# Patient Record
Sex: FEMALE | Race: WHITE
[De-identification: names, ages, dates, MRNs, and addresses within clinical notes are randomized per-mention and may not be internally consistent; named-entity substitution may affect disease eponyms.]

---

## 2018-06-04 ENCOUNTER — HOSPITAL ENCOUNTER (OUTPATIENT)
Dept: HOSPITAL 47 - RADUSWWP | Age: 83
Discharge: HOME | End: 2018-06-04
Payer: MEDICARE

## 2018-06-04 DIAGNOSIS — E04.2: Primary | ICD-10-CM

## 2018-06-04 DIAGNOSIS — E05.90: ICD-10-CM

## 2018-06-04 LAB — T4 FREE SERPL-MCNC: 1.11 NG/DL (ref 0.78–2.19)

## 2018-06-04 PROCEDURE — 84443 ASSAY THYROID STIM HORMONE: CPT

## 2018-06-04 PROCEDURE — 84445 ASSAY OF TSI GLOBULIN: CPT

## 2018-06-04 PROCEDURE — 36415 COLL VENOUS BLD VENIPUNCTURE: CPT

## 2018-06-04 PROCEDURE — 76536 US EXAM OF HEAD AND NECK: CPT

## 2018-06-04 PROCEDURE — 84480 ASSAY TRIIODOTHYRONINE (T3): CPT

## 2018-06-04 PROCEDURE — 84439 ASSAY OF FREE THYROXINE: CPT

## 2018-06-04 NOTE — US
EXAMINATION TYPE: US thyroid st tissue head/neck

 

DATE OF EXAM: 6/4/2018

 

COMPARISON: NONE

 

CLINICAL HISTORY: E05.90 HYPOTHYROIDISM. Hypothyroidism

 

GLAND SIZE:

 

Right Lobe: 4.9 x 1.5 x 2.1 cm

** Overall Parenchyma:  homogenous

Left Lobe: 4.0 x 1.7 x 1.6 cm

** Overall Parenchyma:  homogeneous

Isthmus Thickness:  0.4 cm

 

NODULES

 

RIGHT: # of nodules measured on right: 0 

 

LEFT: # of nodules measured on left:  2

1.  0.6 X 0.4  x 0.5 cm hypoechoic mixed nodule at the lower pole with well-defined margins. This nod
ule is wider than tall and shows no intranodular vascularity.

** Prior size: no previous 

2. 0.4 X 0.3  x 0.5 cm hypoechoic cystic nodule at the mid pole with well-defined margins. This nodul
e is wider than tall and shows no intranodular vascularity.

 ** Prior size:  no previous 

 

ISTHMUS: # of nodules measured in the isthmus:  0

 

Bilateral neck scanned, no evidence of lymphadenopathy.

 

 

 

 

 

IMPRESSION:

Subcentimeter left thyroid nodules that are too small for fine-needle aspiration. Surveillance is rec
ommended.

## 2018-07-11 ENCOUNTER — HOSPITAL ENCOUNTER (OUTPATIENT)
Dept: HOSPITAL 47 - RADNMMAIN | Age: 83
Discharge: HOME | End: 2018-07-11
Payer: MEDICARE

## 2018-07-11 DIAGNOSIS — D05.90: Primary | ICD-10-CM

## 2018-07-11 PROCEDURE — 78014 THYROID IMAGING W/BLOOD FLOW: CPT

## 2018-07-12 NOTE — NM
EXAMINATION TYPE: NM thyroid image w uptake

 

DATE OF EXAM: 7/12/2018

 

COMPARISON: Ultrasound thyroid 6/4/2018

 

HISTORY: Subclinical hyperthyroidism

 

TECHNIQUE:  Thyroid iodine uptake is calculated and images performed after the oral administration of
 322 uCi 1-123 Capsule.

 

FINDINGS: There is normal distribution of the radiopharmaceutical questionable increased uptake, only
 faint visualization of the submandibular glands. The 4 hour iodine uptake is calculated at 5.7% (nor
mal range 8-14%). The 24-hour iodine uptake is calculated at 17.9% (normal range 15-35%).  

 

 

 

IMPRESSION: Findings could be indicative of thyroiditis.

## 2018-08-03 ENCOUNTER — HOSPITAL ENCOUNTER (OUTPATIENT)
Dept: HOSPITAL 47 - LABWHC1 | Age: 83
Discharge: HOME | End: 2018-08-03
Payer: MEDICARE

## 2018-08-03 DIAGNOSIS — E05.90: Primary | ICD-10-CM

## 2018-08-03 LAB
ALBUMIN SERPL-MCNC: 4.3 G/DL (ref 3.5–5)
ALP SERPL-CCNC: 51 U/L (ref 38–126)
ALT SERPL-CCNC: 33 U/L (ref 9–52)
ANION GAP SERPL CALC-SCNC: 6 MMOL/L
AST SERPL-CCNC: 32 U/L (ref 14–36)
BUN SERPL-SCNC: 27 MG/DL (ref 7–17)
CALCIUM SPEC-MCNC: 9.9 MG/DL (ref 8.4–10.2)
CHLORIDE SERPL-SCNC: 108 MMOL/L (ref 98–107)
CO2 SERPL-SCNC: 27 MMOL/L (ref 22–30)
ERYTHROCYTE [DISTWIDTH] IN BLOOD BY AUTOMATED COUNT: 4.44 M/UL (ref 3.8–5.4)
ERYTHROCYTE [DISTWIDTH] IN BLOOD: 14.2 % (ref 11.5–15.5)
GLUCOSE SERPL-MCNC: 121 MG/DL (ref 74–99)
HCT VFR BLD AUTO: 38.3 % (ref 34–46)
HGB BLD-MCNC: 12.7 GM/DL (ref 11.4–16)
MCH RBC QN AUTO: 28.6 PG (ref 25–35)
MCHC RBC AUTO-ENTMCNC: 33.1 G/DL (ref 31–37)
MCV RBC AUTO: 86.2 FL (ref 80–100)
PLATELET # BLD AUTO: 199 K/UL (ref 150–450)
POTASSIUM SERPL-SCNC: 4.6 MMOL/L (ref 3.5–5.1)
PROT SERPL-MCNC: 7 G/DL (ref 6.3–8.2)
SODIUM SERPL-SCNC: 141 MMOL/L (ref 137–145)
WBC # BLD AUTO: 6.4 K/UL (ref 3.8–10.6)

## 2018-08-03 PROCEDURE — 36415 COLL VENOUS BLD VENIPUNCTURE: CPT

## 2018-08-03 PROCEDURE — 85027 COMPLETE CBC AUTOMATED: CPT

## 2018-08-03 PROCEDURE — 80053 COMPREHEN METABOLIC PANEL: CPT

## 2018-08-30 ENCOUNTER — HOSPITAL ENCOUNTER (OUTPATIENT)
Dept: HOSPITAL 47 - LABWHC1 | Age: 83
Discharge: HOME | End: 2018-08-30
Payer: MEDICARE

## 2018-08-30 DIAGNOSIS — E05.90: Primary | ICD-10-CM

## 2018-08-30 LAB — T4 FREE SERPL-MCNC: 0.89 NG/DL (ref 0.78–2.19)

## 2018-08-30 PROCEDURE — 84439 ASSAY OF FREE THYROXINE: CPT

## 2018-08-30 PROCEDURE — 36415 COLL VENOUS BLD VENIPUNCTURE: CPT

## 2018-08-30 PROCEDURE — 84480 ASSAY TRIIODOTHYRONINE (T3): CPT

## 2018-08-30 PROCEDURE — 84443 ASSAY THYROID STIM HORMONE: CPT

## 2018-10-19 ENCOUNTER — HOSPITAL ENCOUNTER (OUTPATIENT)
Dept: HOSPITAL 47 - LABWHC1 | Age: 83
Discharge: HOME | End: 2018-10-19
Payer: MEDICARE

## 2018-10-19 DIAGNOSIS — E05.90: Primary | ICD-10-CM

## 2018-10-19 LAB — T4 FREE SERPL-MCNC: 1.02 NG/DL (ref 0.78–2.19)

## 2018-10-19 PROCEDURE — 36415 COLL VENOUS BLD VENIPUNCTURE: CPT

## 2018-10-19 PROCEDURE — 84443 ASSAY THYROID STIM HORMONE: CPT

## 2018-10-19 PROCEDURE — 84439 ASSAY OF FREE THYROXINE: CPT

## 2018-10-19 PROCEDURE — 84480 ASSAY TRIIODOTHYRONINE (T3): CPT

## 2018-12-07 ENCOUNTER — HOSPITAL ENCOUNTER (OUTPATIENT)
Dept: HOSPITAL 47 - RADUSWWP | Age: 83
Discharge: HOME | End: 2018-12-07
Attending: FAMILY MEDICINE
Payer: MEDICARE

## 2018-12-07 DIAGNOSIS — R79.1: Primary | ICD-10-CM

## 2018-12-07 PROCEDURE — 93970 EXTREMITY STUDY: CPT

## 2018-12-07 NOTE — US
EXAMINATION TYPE: US venous doppler duplex LE 

 

DATE OF EXAM: 12/7/2018 9:42 AM

 

COMPARISON: NONE

 

CLINICAL HISTORY: R79.1 elevated D Dimer. No hx of blood clots. On aspirin.  Elevated ddimer.  Patien
t states sometimes getting left leg tingling that comes and goes. 

 

SIDE PERFORMED: Bilateral  

 

TECHNIQUE:  The lower extremity deep venous system is examined utilizing real time linear array sonog
alberto with graded compression, doppler sonography and color-flow sonography.

 

VESSELS IMAGED:

External Iliac Vein (EIV)

Common Femoral Vein

Deep Femoral Vein

Greater Saphenous Vein *

Femoral Vein

Popliteal Vein

Small Saphenous Vein *

Proximal Calf Veins

(* superficial vessels)

 

Grayscale, color doppler, spectral doppler imaging performed of the deep veins of the lower extremiti
es.  There is normal flow, compressibility, vascular waveforms.

 

Right Leg:  Negative for DVT

 

Left Leg:  Negative for DVT

 

 

 

IMPRESSION:  No sonographic evidence of deep venous thrombosis within either lower extremity.

## 2019-01-04 ENCOUNTER — HOSPITAL ENCOUNTER (OUTPATIENT)
Dept: HOSPITAL 47 - LABWHC1 | Age: 84
Discharge: HOME | End: 2019-01-04
Payer: MEDICARE

## 2019-01-04 DIAGNOSIS — E05.90: Primary | ICD-10-CM

## 2019-01-04 LAB — T4 FREE SERPL-MCNC: 1.3 NG/DL (ref 0.8–1.8)

## 2019-01-04 PROCEDURE — 84443 ASSAY THYROID STIM HORMONE: CPT

## 2019-01-04 PROCEDURE — 36415 COLL VENOUS BLD VENIPUNCTURE: CPT

## 2019-01-04 PROCEDURE — 84439 ASSAY OF FREE THYROXINE: CPT

## 2019-01-04 PROCEDURE — 84480 ASSAY TRIIODOTHYRONINE (T3): CPT

## 2019-04-04 ENCOUNTER — HOSPITAL ENCOUNTER (OUTPATIENT)
Dept: HOSPITAL 47 - LABWHC1 | Age: 84
End: 2019-04-04
Attending: INTERNAL MEDICINE
Payer: MEDICARE

## 2019-04-04 DIAGNOSIS — E05.90: Primary | ICD-10-CM

## 2019-04-04 LAB — T4 FREE SERPL-MCNC: 1.2 NG/DL (ref 0.8–1.8)

## 2019-04-04 PROCEDURE — 84480 ASSAY TRIIODOTHYRONINE (T3): CPT

## 2019-04-04 PROCEDURE — 84443 ASSAY THYROID STIM HORMONE: CPT

## 2019-04-04 PROCEDURE — 84439 ASSAY OF FREE THYROXINE: CPT

## 2019-04-04 PROCEDURE — 36415 COLL VENOUS BLD VENIPUNCTURE: CPT

## 2019-06-20 ENCOUNTER — HOSPITAL ENCOUNTER (OUTPATIENT)
Dept: HOSPITAL 47 - LABWHC1 | Age: 84
Discharge: HOME | End: 2019-06-20
Attending: INTERNAL MEDICINE
Payer: MEDICARE

## 2019-06-20 DIAGNOSIS — E05.90: Primary | ICD-10-CM

## 2019-06-20 LAB — T4 FREE SERPL-MCNC: 1.1 NG/DL (ref 0.8–1.8)

## 2019-06-20 PROCEDURE — 84480 ASSAY TRIIODOTHYRONINE (T3): CPT

## 2019-06-20 PROCEDURE — 36415 COLL VENOUS BLD VENIPUNCTURE: CPT

## 2019-06-20 PROCEDURE — 84439 ASSAY OF FREE THYROXINE: CPT

## 2019-06-20 PROCEDURE — 84443 ASSAY THYROID STIM HORMONE: CPT

## 2019-12-12 ENCOUNTER — HOSPITAL ENCOUNTER (OUTPATIENT)
Dept: HOSPITAL 47 - RADUSWWP | Age: 84
Discharge: HOME | End: 2019-12-12
Attending: INTERNAL MEDICINE
Payer: MEDICARE

## 2019-12-12 ENCOUNTER — HOSPITAL ENCOUNTER (OUTPATIENT)
Dept: HOSPITAL 47 - LABWHC1 | Age: 84
Discharge: HOME | End: 2019-12-12
Attending: INTERNAL MEDICINE
Payer: MEDICARE

## 2019-12-12 DIAGNOSIS — E05.90: Primary | ICD-10-CM

## 2019-12-12 DIAGNOSIS — E04.2: Primary | ICD-10-CM

## 2019-12-12 LAB — T4 FREE SERPL-MCNC: 1 NG/DL (ref 0.8–1.8)

## 2019-12-12 PROCEDURE — 76536 US EXAM OF HEAD AND NECK: CPT

## 2019-12-12 PROCEDURE — 84439 ASSAY OF FREE THYROXINE: CPT

## 2019-12-12 PROCEDURE — 36415 COLL VENOUS BLD VENIPUNCTURE: CPT

## 2019-12-12 PROCEDURE — 84480 ASSAY TRIIODOTHYRONINE (T3): CPT

## 2019-12-12 PROCEDURE — 84443 ASSAY THYROID STIM HORMONE: CPT

## 2019-12-12 NOTE — US
EXAMINATION TYPE: US thyroid st tissue head/neck

 

DATE OF EXAM: 12/12/2019

 

COMPARISON: US

 

CLINICAL HISTORY: E04.2 MULTINODULAR GOITER.

 

GLAND SIZE:

 

Right Lobe: 4.9 x 1.9 x 1.7 cm

** Overall Parenchyma:  homogenous

Left Lobe: 3.7 x 1.7 x 1.5 cm

** Overall Parenchyma:  homogeneous

Isthmus Thickness:  0.5 cm

 

NODULES

 

RIGHT:   # of nodules measured on right: 0

 

 

LEFT:    # of nodules measured on left:  2

1.  0.6 X 0.5  x 0.5 cm hypoechoic cystic nodule at the lower pole with well-defined margins; .  This
 nodule is wider than tall and shows no intranodular vascularity.

** Prior size: 0.5 x 0.5  x 0.5 cm

 

2. 0.4 X 0.3  x 0.5 cm hypoechoic cystic nodule at the mid pole with well-defined margins; .  This no
dule is wider than tall and shows no intranodular vascularity.

 ** Prior size:  0.4 x 0.3  x 0.5 cm 

 

ISTHMUS:    # of nodules measured in the isthmus:  0

 

Bilateral neck scanned, no evidence of lymphadenopathy.

 

Multiple small cystic area noted on the right lobe.

 

 

 

IMPRESSION:

Stable cystic lesions as noted above which are subcentimeter in size.

## 2020-12-10 ENCOUNTER — HOSPITAL ENCOUNTER (OUTPATIENT)
Dept: HOSPITAL 47 - RADUSWWP | Age: 85
Discharge: HOME | End: 2020-12-10
Attending: INTERNAL MEDICINE
Payer: MEDICARE

## 2020-12-10 DIAGNOSIS — E05.90: ICD-10-CM

## 2020-12-10 DIAGNOSIS — E04.1: Primary | ICD-10-CM

## 2020-12-10 LAB — T4 FREE SERPL-MCNC: 1.19 NG/DL (ref 0.78–2.19)

## 2020-12-10 PROCEDURE — 76536 US EXAM OF HEAD AND NECK: CPT

## 2020-12-10 PROCEDURE — 84439 ASSAY OF FREE THYROXINE: CPT

## 2020-12-10 PROCEDURE — 84443 ASSAY THYROID STIM HORMONE: CPT

## 2020-12-10 NOTE — US
EXAMINATION TYPE: US thyroid st tissue head/neck

 

DATE OF EXAM: 12/10/2020

 

COMPARISON: Thyroid ultrasound December 12, 2019

 

CLINICAL HISTORY: E04.2 non toxic multinodular goiter.

 

GLAND SIZE:

 

Right Lobe: 4.5 x 1.9 x 1.7 cm

** Overall Parenchyma:  homogenous

Left Lobe: 3.5 x 1.5 x 1.1 cm

** Overall Parenchyma:  homogeneous

Isthmus Thickness:  0.5 cm

 

NODULES

 

RIGHT:   # of nodules measured on right: 0

 

 

LEFT:    # of nodules measured on left:  small cysts noted, largest measuring

1.  0.6 X 0.4  x 0.7 cm cystic or almost completely cystic, anechoic nodule, which is taller than wid
e, with smooth margins, without echogenic foci.

 ** Prior size: 0.6 X 0.5 x 0.5 cm

 

 

 

ISTHMUS:    # of nodules measured in the isthmus:  0

 

 

Bilateral neck scanned, no evidence of lymphadenopathy.

 

Homogeneous small size thyroid with stable small left-sided nodule.

 

IMPRESSION: As above. No new or enlarging greater than 1 cm nodules.

## 2021-06-22 ENCOUNTER — HOSPITAL ENCOUNTER (OUTPATIENT)
Dept: HOSPITAL 47 - LABWHC1 | Age: 86
Discharge: HOME | End: 2021-06-22
Attending: INTERNAL MEDICINE
Payer: MEDICARE

## 2021-06-22 DIAGNOSIS — E05.90: Primary | ICD-10-CM

## 2021-06-22 PROCEDURE — 84439 ASSAY OF FREE THYROXINE: CPT

## 2021-06-22 PROCEDURE — 84480 ASSAY TRIIODOTHYRONINE (T3): CPT

## 2021-06-22 PROCEDURE — 36415 COLL VENOUS BLD VENIPUNCTURE: CPT

## 2021-06-22 PROCEDURE — 84443 ASSAY THYROID STIM HORMONE: CPT

## 2021-06-23 LAB — T4 FREE SERPL-MCNC: 0.9 NG/DL (ref 0.8–1.8)

## 2021-09-07 ENCOUNTER — HOSPITAL ENCOUNTER (OUTPATIENT)
Dept: HOSPITAL 47 - LABWHC1 | Age: 86
Discharge: HOME | End: 2021-09-07
Attending: INTERNAL MEDICINE
Payer: MEDICARE

## 2021-09-07 DIAGNOSIS — E05.90: Primary | ICD-10-CM

## 2021-09-07 LAB — T4 FREE SERPL-MCNC: 1.2 NG/DL (ref 0.8–1.8)

## 2021-09-07 PROCEDURE — 84480 ASSAY TRIIODOTHYRONINE (T3): CPT

## 2021-09-07 PROCEDURE — 84439 ASSAY OF FREE THYROXINE: CPT

## 2021-09-07 PROCEDURE — 84443 ASSAY THYROID STIM HORMONE: CPT

## 2021-09-07 PROCEDURE — 36415 COLL VENOUS BLD VENIPUNCTURE: CPT

## 2021-10-11 ENCOUNTER — HOSPITAL ENCOUNTER (OUTPATIENT)
Dept: HOSPITAL 47 - RADXRMAIN | Age: 86
Discharge: HOME | End: 2021-10-11
Attending: FAMILY MEDICINE
Payer: MEDICARE

## 2021-10-11 DIAGNOSIS — M85.842: Primary | ICD-10-CM

## 2021-10-11 DIAGNOSIS — M85.841: ICD-10-CM

## 2021-10-11 NOTE — XR
EXAMINATION TYPE: XR hand complete bilateral

 

DATE OF EXAM: 10/11/2021

 

COMPARISON: NONE

 

HISTORY: Pain

 

TECHNIQUE: Three views are submitted.

 

FINDINGS:

Diffuse osteopenia. There is narrowing the DIP and PIP joints of all digits. Mild narrowing the first
 carpal metacarpal joint. Mild narrowing of the radiocarpal joint. No erosive changes. No acute fract
ure or dislocation.

 

IMPRESSION:

1. Diffuse osteopenia and mild bilateral arthropathy.

## 2021-11-15 ENCOUNTER — HOSPITAL ENCOUNTER (OUTPATIENT)
Dept: HOSPITAL 47 - RADBDWWP | Age: 86
Discharge: HOME | End: 2021-11-15
Attending: FAMILY MEDICINE
Payer: MEDICARE

## 2021-11-15 DIAGNOSIS — M81.0: Primary | ICD-10-CM

## 2021-11-15 PROCEDURE — 77080 DXA BONE DENSITY AXIAL: CPT

## 2021-11-15 NOTE — BD
EXAMINATION TYPE: Axial Bone Density

 

DATE OF EXAM: 11/15/2021

 

COMPARISON: NONE

 

CLINICAL HISTORY: Postmenopausal screening

 

Height:  63

Weight:  183.8

 

FRAX RISK QUESTIONS:

Alcohol (3 or more units per day):  no

Family History (Parent hip fracture):  no

Glucocorticoids (More than 3mos):  no

           (Ex: prednisone, prednisolone, methylprednisolone, dexamethasone, and hydrocortisone).    
     

History of Fracture in Adulthood: no

Secondary Osteoporosis:

  1.  Type 1 Diabetes: no

  2.  Hyperthyroidism: no

  3.  Menopause before 45: no

  4.  Malnutrition: no

  5.  Chronic liver disease: no

Rheumatoid Arthritis: no

Current Tobacco Use: no

 

RISK FACTORS 

HISTORY OF: 

Surgery to Spine/Hip(right/left)/Wrist (right/left): no

Family History of Osteoporosis: no

Active: yes

Diet low in dairy products/other sources of calcium:  yes

Postmenopausal woman: yes

Lost more than 2 inches in height since high school: no

 

MEDICATIONS: losartan, metroprolol. lipitor, aspirin, fish oil, calcium

 

 

Additional History:

 

 

EXAM MEASUREMENTS: 

Bone mineral densitometry was performed using the Seesmic System.

Bone mineral density as measured about the Lumbar spine is:  

----- L1-L4(G/cm2): 1.353

T Score Values are as follows:

----- L2: 1.5

----- L3: 2.6

----- L4: 1.2

----- L1-L4: 1.4

Bone mineral density : baseline

 

Bone mineral density about the R hip (g/cm2): 0.955

Bone mineral density about the L hip (g/cm2): 0.873

T Score values are as follows:

-----R Neck: -0.6

-----L Neck: -1.2

-----R Total: -0.3

-----L Total: -0.5

Bone mineral density : baseline

 

 

IMPRESSION:

Osteopenia (T Score between -2.5 and -1).

 

There is slightly increased risk of fracture and the patient may be considered 

for treatment. 

 

Re-Screen 2-5 years.

 

NOTE:  T-SCORE=SD OF THE YOUNG ADULT MEAN.

## 2021-12-08 ENCOUNTER — HOSPITAL ENCOUNTER (OUTPATIENT)
Dept: HOSPITAL 47 - RADUSWWP | Age: 86
Discharge: HOME | End: 2021-12-08
Attending: INTERNAL MEDICINE
Payer: MEDICARE

## 2021-12-08 DIAGNOSIS — E04.1: Primary | ICD-10-CM

## 2021-12-08 LAB — T4 FREE SERPL-MCNC: 1.07 NG/DL (ref 0.78–2.19)

## 2021-12-08 PROCEDURE — 84443 ASSAY THYROID STIM HORMONE: CPT

## 2021-12-08 PROCEDURE — 84480 ASSAY TRIIODOTHYRONINE (T3): CPT

## 2021-12-08 PROCEDURE — 76536 US EXAM OF HEAD AND NECK: CPT

## 2021-12-08 PROCEDURE — 84439 ASSAY OF FREE THYROXINE: CPT

## 2021-12-08 NOTE — US
EXAMINATION TYPE: US thyroid st tissue head/neck

 

DATE OF EXAM: 12/8/2021

 

COMPARISON: NONE

 

CLINICAL HISTORY: E04.2 nontoxic multinodular goiter.

 

GLAND SIZE:

 

Right Lobe: 3.7 x 1.5 x 1.6 cm

** Overall Parenchyma:  homogenous

Left Lobe: 3.5 x 1.3 x 1.4 cm

** Overall Parenchyma:  homogeneous

Isthmus Thickness:  0.6 cm

 

NODULES

 

RIGHT:   # of nodules measured on right: 0

 

LEFT:    # of nodules measured on left: 0

1.  0.7 X 0.5  x 0.7 cm, lower, cystic or almost completely cystic, anechoic nodule, which is wider t
pinzon tall, with smooth margins, without echogenic foci.

 ** Prior size: 0.6 x 0.5  x 0.5 cm 

 

 

ISTHMUS:    # of nodules measured in the isthmus:  0

 

Bilateral neck scanned, no evidence of lymphadenopathy.

 

 

 

IMPRESSION:

Benign cyst left lobe thyroid

 

2017 ACR TI-RADS LEVEL: TR-RADS 1 - BENIGN: No FNA

*Highest TI-RADS level nodule reported

## 2022-01-14 ENCOUNTER — HOSPITAL ENCOUNTER (OUTPATIENT)
Dept: HOSPITAL 47 - LABWHC1 | Age: 87
Discharge: HOME | End: 2022-01-14
Attending: FAMILY MEDICINE
Payer: MEDICARE

## 2022-01-14 DIAGNOSIS — E11.9: ICD-10-CM

## 2022-01-14 DIAGNOSIS — E55.9: ICD-10-CM

## 2022-01-14 DIAGNOSIS — I10: Primary | ICD-10-CM

## 2022-01-14 LAB
ALBUMIN SERPL-MCNC: 4.4 G/DL (ref 3.8–4.9)
ALBUMIN/GLOB SERPL: 1.83 G/DL (ref 1.6–3.17)
ALP SERPL-CCNC: 51 U/L (ref 41–126)
ALT SERPL-CCNC: 23 U/L (ref 8–44)
ANION GAP SERPL CALC-SCNC: 10.1 MMOL/L (ref 10–18)
AST SERPL-CCNC: 28 U/L (ref 13–35)
BASOPHILS # BLD AUTO: 0.04 X 10*3/UL (ref 0–0.1)
BASOPHILS NFR BLD AUTO: 0.8 %
BUN SERPL-SCNC: 19.7 MG/DL (ref 9–27)
BUN/CREAT SERPL: 14.7 RATIO (ref 12–20)
CALCIUM SPEC-MCNC: 10.1 MG/DL (ref 8.7–10.3)
CHLORIDE SERPL-SCNC: 104 MMOL/L (ref 96–109)
CHOLEST SERPL-MCNC: 176 MG/DL (ref 0–200)
CO2 SERPL-SCNC: 27.4 MMOL/L (ref 20–27.5)
EOSINOPHIL # BLD AUTO: 0.09 X 10*3/UL (ref 0.04–0.35)
EOSINOPHIL NFR BLD AUTO: 1.7 %
ERYTHROCYTE [DISTWIDTH] IN BLOOD BY AUTOMATED COUNT: 4.4 X 10*6/UL (ref 4.1–5.2)
ERYTHROCYTE [DISTWIDTH] IN BLOOD: 13.8 % (ref 11.5–14.5)
GLOBULIN SER CALC-MCNC: 2.4 G/DL (ref 1.6–3.3)
GLUCOSE SERPL-MCNC: 109 MG/DL (ref 70–110)
HCT VFR BLD AUTO: 40.5 % (ref 37.2–46.3)
HDLC SERPL-MCNC: 72.1 MG/DL (ref 40–60)
HGB BLD-MCNC: 12.9 G/DL (ref 12–15)
LDLC SERPL CALC-MCNC: 84 MG/DL (ref 0–131)
LYMPHOCYTES # SPEC AUTO: 1.42 X 10*3/UL (ref 0.9–5)
LYMPHOCYTES NFR SPEC AUTO: 27.5 %
MCH RBC QN AUTO: 29.3 PG (ref 27–32)
MCHC RBC AUTO-ENTMCNC: 31.9 G/DL (ref 32–37)
MCV RBC AUTO: 92 FL (ref 80–97)
MONOCYTES # BLD AUTO: 0.56 X 10*3/UL (ref 0.2–1)
MONOCYTES NFR BLD AUTO: 10.8 %
NEUTROPHILS # BLD AUTO: 3.05 X 10*3/UL (ref 1.8–7.7)
NEUTROPHILS NFR BLD AUTO: 59 %
PLATELET # BLD AUTO: 196 X 10*3/UL (ref 140–440)
POTASSIUM SERPL-SCNC: 5.2 MMOL/L (ref 3.5–5.5)
PROT SERPL-MCNC: 6.8 G/DL (ref 6.2–8.2)
SODIUM SERPL-SCNC: 141 MMOL/L (ref 135–145)
T4 FREE SERPL-MCNC: 1.12 NG/DL (ref 0.8–1.8)
TRIGL SERPL-MCNC: 99.6 MG/DL (ref 0–149)
VLDLC SERPL CALC-MCNC: 19.92 MG/DL (ref 5–40)
WBC # BLD AUTO: 5.17 X 10*3/UL (ref 4.5–10)

## 2022-01-14 PROCEDURE — 85025 COMPLETE CBC W/AUTO DIFF WBC: CPT

## 2022-01-14 PROCEDURE — 80053 COMPREHEN METABOLIC PANEL: CPT

## 2022-01-14 PROCEDURE — 82306 VITAMIN D 25 HYDROXY: CPT

## 2022-01-14 PROCEDURE — 36415 COLL VENOUS BLD VENIPUNCTURE: CPT

## 2022-01-14 PROCEDURE — 84443 ASSAY THYROID STIM HORMONE: CPT

## 2022-01-14 PROCEDURE — 83036 HEMOGLOBIN GLYCOSYLATED A1C: CPT

## 2022-01-14 PROCEDURE — 84439 ASSAY OF FREE THYROXINE: CPT

## 2022-01-14 PROCEDURE — 80061 LIPID PANEL: CPT

## 2022-01-14 PROCEDURE — 85379 FIBRIN DEGRADATION QUANT: CPT

## 2022-01-18 ENCOUNTER — HOSPITAL ENCOUNTER (OUTPATIENT)
Dept: HOSPITAL 47 - RADUSWWP | Age: 87
Discharge: HOME | End: 2022-01-18
Attending: FAMILY MEDICINE
Payer: MEDICARE

## 2022-01-18 DIAGNOSIS — R79.1: Primary | ICD-10-CM

## 2022-01-18 PROCEDURE — 93970 EXTREMITY STUDY: CPT

## 2022-01-18 NOTE — US
EXAMINATION TYPE: US venous doppler duplex LE 

 

DATE OF EXAM: 1/18/2022 10:39 AM

 

COMPARISON: Prior ultrasound December 7, 2018

 

CLINICAL HISTORY: R79.1 elevated d dimer. Elevated D-dimer

 

SIDE PERFORMED: Bilateral  

 

TECHNIQUE:  The lower extremity deep venous system is examined utilizing real time linear array sonog
alberto with graded compression, doppler sonography and color-flow sonography.

 

VESSELS IMAGED:

Common Femoral Vein

Deep Femoral Vein

Greater Saphenous Vein *

Femoral Vein

Popliteal Vein

Small Saphenous Vein *

Proximal Calf Veins

(* superficial vessels)

 

 

 

Right Leg:  Negative for DVT, Probable Baker's cyst right pop fossa=4.2 x 1.4 x 2.4 cm

 

Left Leg:  Negative for DVT

 

Grayscale, color doppler, spectral doppler imaging performed of the deep veins of the bilateral lower
 extremities.  There is normal flow, compressibility, vascular waveforms. 

 

IMPRESSION:   No ultrasound evidence for acute DVT in either lower extremity. A small to moderate siz
ed popliteal cyst is felt present on images saved today not clearly seen on 2018 ultrasound.

## 2022-12-05 NOTE — P.HPOR
History of Present Illness


H&P Date: 12/05/22


Chief Complaint: Right carpal tunnel syndrome


Subjective:


This is a 87 year old female that presents today for initial evaluation 

regarding a several year history of progressively worsening right and left hand 

paresthesias in the thumb, index, middle and ring fingers. She has tried bracing

with little relief. She denies any inciting event or neck pain. Her right side 

is worse than her left.





Physical Examination:





RUE: AIN/PIN/Radial/Ulnar/Median motor intact. Radial/Ulnar/Median SILT. 2+/4 

Radial/Ulnar pulses palpated. 5/5 APB, 5/5 FDI. Negative Finkelsteins, negative 

CMC grind, positive Durkan's compression. 





LUE: AIN/PIN/Radial/Ulnar/Median motor intact. Radial/Ulnar/Median SILT. 2+/4 

Radial/Ulnar pulses palpated. 5/5 APB, 5/5 FDI. Negative Finkelsteins, negative 

CMC grind, positive Durkan's compression. 





Impression:


1.) B/L carpal tunnel syndrome








Plan:





Diagnosis and treatment options were discussed with the patient. The patient has

failed conservative treatment and would like to pursue a right endoscopic vs 

open carpal tunnel release. Risks and benefits of surgery including bleeding, 

infection, damage to surrounding tissue, need for further surgery, possible need

to convert to open procedure, residual numbness were discussed and the patient 

wished to go forward with surgery.





Follow up: 2 weeks post op








-Evelio Almanza DO


Orthopedic Hand/Upper Extremity Surgeon








Past Medical History


Past Medical History: Hyperlipidemia, Hypertension, Osteoarthritis (OA)


Additional Past Medical History / Comment(s): numbness in hands, arthritis 

generalized.


History of Any Multi-Drug Resistant Organisms: None Reported


Past Surgical History: Heart Catheterization With Stent, Joint Replacement


Additional Past Surgical History / Comment(s): cataracts bilateral with lenses, 

left knee replaced.


Past Anesthesia/Blood Transfusion Reactions: Postoperative Nausea & Vomiting 

(PONV)


Additional Past Anesthesia/Blood Transfusion Reaction / Comment(s): no blood 

transfusions.


Date of Last Stent Placement:: 2000


Smoking Status: Never smoker





- Past Family History


  ** Mother


Family Medical History: Cancer, CVA/TIA


Additional Family Medical History / Comment(s): ovarian cancer





  ** Father


Family Medical History: Coronary Artery Disease (CAD)





  ** Sister(s)


Family Medical History: Cancer


Additional Family Medical History / Comment(s): breast cancer.  brother and 

sister that were diabetic





Medications and Allergies


                                Home Medications











 Medication  Instructions  Recorded  Confirmed  Type


 


Aspirin 81 mg PO DAILY 12/05/22 12/05/22 History


 


Atorvastatin Calcium 40 mg PO HS 12/05/22 12/05/22 History


 


Losartan-Hctz 50-12.5 mg [Hyzaar 1 tab PO DAILY 12/05/22 12/05/22 History





50-12.5]    


 


Unk Calcium 1 tab PO DAILY 12/05/22 12/05/22 History


 


Unk Fish Oil 1 tab PO DAILY 12/05/22 12/05/22 History


 


carvediloL 25 mg PO HS 12/05/22 12/05/22 History








                                    Allergies











Allergy/AdvReac Type Severity Reaction Status Date / Time


 


Penicillins Allergy  Swelling Verified 12/05/22 10:31














Physical Examination


Osteopathic Statement: *.  No significant issues noted on an osteopathic 

structural exam other than those noted in the History and Physical/Consult.

## 2022-12-07 ENCOUNTER — HOSPITAL ENCOUNTER (OUTPATIENT)
Dept: HOSPITAL 47 - OR | Age: 87
Discharge: HOME | End: 2022-12-07
Attending: ORTHOPAEDIC SURGERY
Payer: MEDICARE

## 2022-12-07 VITALS — SYSTOLIC BLOOD PRESSURE: 109 MMHG | HEART RATE: 81 BPM | DIASTOLIC BLOOD PRESSURE: 66 MMHG

## 2022-12-07 VITALS — RESPIRATION RATE: 16 BRPM

## 2022-12-07 VITALS — TEMPERATURE: 97.8 F

## 2022-12-07 DIAGNOSIS — Z82.49: ICD-10-CM

## 2022-12-07 DIAGNOSIS — Z79.890: ICD-10-CM

## 2022-12-07 DIAGNOSIS — Z80.3: ICD-10-CM

## 2022-12-07 DIAGNOSIS — Z79.899: ICD-10-CM

## 2022-12-07 DIAGNOSIS — Z88.0: ICD-10-CM

## 2022-12-07 DIAGNOSIS — Z79.82: ICD-10-CM

## 2022-12-07 DIAGNOSIS — E78.5: ICD-10-CM

## 2022-12-07 DIAGNOSIS — M19.90: ICD-10-CM

## 2022-12-07 DIAGNOSIS — I10: ICD-10-CM

## 2022-12-07 DIAGNOSIS — G56.01: Primary | ICD-10-CM

## 2022-12-07 DIAGNOSIS — Z95.5: ICD-10-CM

## 2022-12-07 PROCEDURE — 29848 WRIST ENDOSCOPY/SURGERY: CPT

## 2022-12-07 NOTE — P.OP
Date of Procedure: 12/07/22


Preoperative Diagnosis: 


Right carpal tunnel syndrome


Postoperative Diagnosis: 


Right carpal tunnel syndrome


Procedure(s) Performed: 


Right endoscopic carpal tunnel release


Anesthesia: MAC


Surgeon: Evelio Almanza


Assistant #1: Florencio Cueva


Estimated Blood Loss (ml): 0


Pathology: none sent


Condition: stable


Disposition: PACU


Description of Procedure: 


This is a 87 year old female who presents today for a right endoscopic carpal 

tunnel release after having failed conservative treatment in the past. Risks and

benefits of surgery were discussed with the patient including bleeding, damage 

to surrounding tissue, infection, need to convert to open procedure, need for 

further surgery as well as risks of anesthesia including pulmonary embolism and 

even death and the patient wished to proceed with surgical intervention. The 

patients was seen in the pre-operative area by myself. Consent and H&P were 

completed and updated. The correct extremity was marked in the pre-operative 

area by myself and all other questions were answered. 





Operative Narrative:


   The patient was brought to the operating room by the department of 

anesthesia. They remained on the portable stretcher and a rolling hand table was

brought to the side of the operative extremity. Pre-operative time out was 

performed indicating the correct patient, procedure and laterality. All in the 

room agreed. The patient was then drifted off to sleep by the department of an

esthesia. MAC anesthesia was utilized and a 50:50 mixture of 1% Lidocaine and 

0.5% bupivacaine was injected into the subcutaneous tissues of the palmar skin, 

8ccs total. A nonsterile tourniquet was then applied to the operative extremity

and the right upper extremity was then prepped and draped in normal sterile 

fashion.  The operative extremity was the exsanguinated with an esmarch bandage 

and the tourniquet was inflated to 250mmHg.  








15 blade scalpel was utilized to make a transverse incision on the palmar skin 

just ulnar to the palmaris longus tendon at the level of the distal wrist crea

se. Ragnell retractor was then placed radially and blunt dissection was 

performed to reveal the distal forearm fascia. This was lifted with fine Anatoliy 

pick ups and Littler tenotomy scissors were then used to open the forearm 

fascia transversely and a double skin hook was then placed. Hamate finder was 

placed into the carpal tunnel and then sequential sized dilators were inserted 

followed by the synovial elevator to separate the flexor tenosynovium from the 

undersurface of the transverse carpal ligament and a washboard texture was felt.

The Micro[employer] endoscopic carpal tunnel release system gun was the then inserted 

into the carpal tunnel hugging the deep portion of the transverse carpal 

ligament in line with the base of the ring finger. Transverse fibers of the 

ligament were directly visualized. Pressure was applied on the palm to reveal 

the distal extent of the transverse carpal ligament. The blade was then deployed

and the distal half of the transverse carpal ligament was released. The scope 

was then brought distal again and remaining transverse fibers were incised with 

the blade. The proximal half of the transverse carpal ligament was then divided 

and again the scope was advanced distal and remaining transverse fibers were 

incised with the blade. The radial and ulnar leaflets were directly visualized 

and mobile consistent with complete release. Tenotomy scissors were then u

tilized to release the remaining distal forearm fascia under direct 

visualization taking care to preserve the palmar cutaneous branch of the median 

nerve. Skin closure was performed with interrupted 4-0 Monocryl suture followed 

by Mastisol and steri strips. Sterile dressing was applied consisting of 

adaptic, 4x4s, Webril, and an ace bandage. Tourniquet was let down and the hand 

immediately was well perfused. The patient was then woken by the department of 

anesthesia and transferred to PACU in stable condition. Florencio LOPEZ was 

present for the case in its entirety and assisted in major portions of the case 

and protection of vital neurovascular structures. 








Evelio Almanza D.O.


Orthopedic Hand/Upper Extremity Surgeon